# Patient Record
Sex: MALE | Race: AMERICAN INDIAN OR ALASKA NATIVE
[De-identification: names, ages, dates, MRNs, and addresses within clinical notes are randomized per-mention and may not be internally consistent; named-entity substitution may affect disease eponyms.]

---

## 2018-05-27 ENCOUNTER — HOSPITAL ENCOUNTER (EMERGENCY)
Dept: HOSPITAL 5 - ED | Age: 48
Discharge: HOME | End: 2018-05-27
Payer: SELF-PAY

## 2018-05-27 VITALS — DIASTOLIC BLOOD PRESSURE: 88 MMHG | SYSTOLIC BLOOD PRESSURE: 143 MMHG

## 2018-05-27 DIAGNOSIS — F12.10: ICD-10-CM

## 2018-05-27 DIAGNOSIS — I10: ICD-10-CM

## 2018-05-27 DIAGNOSIS — F14.10: ICD-10-CM

## 2018-05-27 DIAGNOSIS — R56.9: Primary | ICD-10-CM

## 2018-05-27 DIAGNOSIS — Z87.891: ICD-10-CM

## 2018-05-27 LAB
BUN SERPL-MCNC: 19 MG/DL (ref 9–20)
BUN/CREAT SERPL: 19 %
CALCIUM SERPL-MCNC: 8.9 MG/DL (ref 8.4–10.2)
HCT VFR BLD CALC: 39.6 % (ref 35.5–45.6)
HEMOLYSIS INDEX: 47
HGB BLD-MCNC: 13.5 GM/DL (ref 11.8–15.2)
MCH RBC QN AUTO: 30 PG (ref 28–32)
MCHC RBC AUTO-ENTMCNC: 34 % (ref 32–34)
MCV RBC AUTO: 87 FL (ref 84–94)
PLATELET # BLD: 133 K/MM3 (ref 140–440)
RBC # BLD AUTO: 4.57 M/MM3 (ref 3.65–5.03)

## 2018-05-27 PROCEDURE — 36415 COLL VENOUS BLD VENIPUNCTURE: CPT

## 2018-05-27 PROCEDURE — 83735 ASSAY OF MAGNESIUM: CPT

## 2018-05-27 PROCEDURE — 99284 EMERGENCY DEPT VISIT MOD MDM: CPT

## 2018-05-27 PROCEDURE — 80048 BASIC METABOLIC PNL TOTAL CA: CPT

## 2018-05-27 PROCEDURE — 96374 THER/PROPH/DIAG INJ IV PUSH: CPT

## 2018-05-27 PROCEDURE — 85027 COMPLETE CBC AUTOMATED: CPT

## 2018-05-27 NOTE — EMERGENCY DEPARTMENT REPORT
HPI





- General


Chief Complaint: Seizure


Time Seen by Provider: 05/27/18 13:25





- HPI


HPI: 





Room 5





The patient is a 49-year-old male presenting with a chief complaint seizures.  

The patient has a history of seizure disorder from traumatic brain injury.  The 

patient's spouse states at approximately 10:40 the patient had a generalized 

tonic-clonic seizure lasting approximately 3-4 minutes.  EMS was called and the 

patient's transport to the ED.  The patient states he's been compliant with his 

antiepileptic medication.  Patient currently denies complaints stating he feels 

normal.  The patient does admit to cocaine use and states he last used 

approximately 3 days ago





Location: Central nervous system


Duration: 3-4 minutes


Quality: Generalized tonic-clonic


Severity: Moderate


Modifying factors: [see above]


Context: [see above]


Mode of transportation: [not driving]





ED Past Medical Hx





- Past Medical History


Previous Medical History?: Yes


Hx Hypertension: Yes


Hx Seizures: Yes


Hx Psychiatric Treatment: Yes (PTSD)


Additional medical history: PTSD, Traumatic brain injury





- Surgical History


Past Surgical History?: Yes


Additional Surgical History: Right shoulder surgery





- Family History


Family history: no significant





- Social History


Smoking Status: Former Smoker (none 1 year)


Substance Use Type: Alcohol (occasional), Cocaine (last used 3 days ago), 

Marijuana





ED Review of Systems


ROS: 


Stated complaint: SEIZURE


Other details as noted in HPI





Constitutional: no symptoms reported


Eyes: denies: eye pain


ENT: denies: throat pain


Cardiovascular: denies: chest pain


Gastrointestinal: denies: abdominal pain


Genitourinary: denies: dysuria


Musculoskeletal: denies: back pain


Neurological: denies: headache





Physical Exam





- Physical Exam


Vital Signs: 


 Vital Signs











  05/27/18 05/27/18 05/27/18





  12:00 12:15 12:30


 


Pulse Rate  81 76


 


Respiratory  30 H 18





Rate   


 


Blood Pressure  136/96 143/99


 


O2 Sat by Pulse 95 97 98





Oximetry   














  05/27/18 05/27/18





  12:45 13:00


 


Pulse Rate 62 65


 


Respiratory 19 20





Rate  


 


Blood Pressure 139/86 143/88


 


O2 Sat by Pulse 91 95





Oximetry  











Physical Exam: 





GENERAL: The patient is well-developed well-nourished male lying on stretcher 

not appear to be in acute distress. []


HEENT: Normocephalic.  Atraumatic.  Extraocular motions are intact.  Patient 

has moist mucous membranes.


NECK: Supple.  Trachea midline


CHEST/LUNGS: Clear to auscultation.  There is no respiratory distress noted.


HEART/CARDIOVASCULAR: Regular.  There is no tachycardia.  There is no gallop 

rub or murmur.


ABDOMEN: Abdomen is soft, nontender.  Patient has normal bowel sounds.  There 

is no abdominal distention.


SKIN: There is no rash.  There is no edema.  There is no diaphoresis.


NEURO: The patient is awake, alert, and oriented.  The patient is cooperative.  

The patient has no focal neurologic deficits.  The patient has normal speech.  

Cranial nerves II through XII grossly intact, no drift


MUSCULOSKELETAL:  There is no evidence of acute injury.





ED Course


 Vital Signs











  05/27/18 05/27/18 05/27/18





  12:00 12:15 12:30


 


Pulse Rate  81 76


 


Respiratory  30 H 18





Rate   


 


Blood Pressure  136/96 143/99


 


O2 Sat by Pulse 95 97 98





Oximetry   














  05/27/18 05/27/18





  12:45 13:00


 


Pulse Rate 62 65


 


Respiratory 19 20





Rate  


 


Blood Pressure 139/86 143/88


 


O2 Sat by Pulse 91 95





Oximetry  














ED Medical Decision Making





- Lab Data


Result diagrams: 


 05/27/18 12:29





 05/27/18 12:29





 Laboratory Tests











  05/27/18 05/27/18 05/27/18





  12:29 12:29 13:52


 


WBC  4.2 L  


 


RBC  4.57  


 


Hgb  13.5  


 


Hct  39.6  


 


MCV  87  


 


MCH  30  


 


MCHC  34  


 


RDW  13.9  


 


Plt Count  133 L  


 


Sodium   138 


 


Potassium   4.2 


 


Chloride   102.4 


 


Carbon Dioxide   24 


 


Anion Gap   16 


 


BUN   19 


 


Creatinine   1.0 


 


Estimated GFR   > 60 


 


BUN/Creatinine Ratio   19 


 


Glucose   101 H 


 


Calcium   8.9 


 


Magnesium    2.20














- Differential Diagnosis


seizure, cocaine abuse


Critical care attestation.: 


If time is entered above; I have spent that time in minutes in the direct care 

of this critically ill patient, excluding procedure time.








ED Disposition


Clinical Impression: 


 Seizure, Substance abuse





Disposition: DC-01 TO HOME OR SELFCARE


Is pt being admited?: No


Does the pt Need Aspirin: No


Condition: Stable


Instructions:  Cocaine Abuse (ED), Recurrent Seizures Adult (ED)


Additional Instructions: 


Return to the emergency department immediately should you develop worsening 

symptoms, fever, inability to tolerate food or liquid or any other concerns.


Referrals: 


PRIMARY CARE,MD [Primary Care Provider] - 3-5 Days


TJ LOREDO MD [Staff Physician] - 3-5 Days (Dr. Loredo is a neurologist.  

Please follow up with him or your own neurologist for further evaluation)


Time of Disposition: 14:26

## 2018-07-09 ENCOUNTER — HOSPITAL ENCOUNTER (OUTPATIENT)
Dept: HOSPITAL 5 - ED | Age: 48
Setting detail: OBSERVATION
LOS: 2 days | Discharge: HOME | End: 2018-07-11
Attending: INTERNAL MEDICINE | Admitting: INTERNAL MEDICINE
Payer: COMMERCIAL

## 2018-07-09 DIAGNOSIS — R79.89: ICD-10-CM

## 2018-07-09 DIAGNOSIS — Z87.820: ICD-10-CM

## 2018-07-09 DIAGNOSIS — F14.10: ICD-10-CM

## 2018-07-09 DIAGNOSIS — R56.9: Primary | ICD-10-CM

## 2018-07-09 DIAGNOSIS — Z91.19: ICD-10-CM

## 2018-07-09 DIAGNOSIS — F41.9: ICD-10-CM

## 2018-07-09 DIAGNOSIS — I10: ICD-10-CM

## 2018-07-09 LAB
ALBUMIN SERPL-MCNC: 4.6 G/DL (ref 3.9–5)
ALT SERPL-CCNC: 69 UNITS/L (ref 7–56)
BENZODIAZEPINES SCREEN,URINE: (no result)
BILIRUB UR QL STRIP: (no result)
BLOOD UR QL VISUAL: (no result)
BUN SERPL-MCNC: 12 MG/DL (ref 9–20)
BUN SERPL-MCNC: 14 MG/DL (ref 9–20)
BUN/CREAT SERPL: 11 %
BUN/CREAT SERPL: 9 %
CALCIUM SERPL-MCNC: 9.1 MG/DL (ref 8.4–10.2)
CALCIUM SERPL-MCNC: 9.4 MG/DL (ref 8.4–10.2)
HCT VFR BLD CALC: 48.3 % (ref 35.5–45.6)
HEMOLYSIS INDEX: 7
HEMOLYSIS INDEX: 9
HGB BLD-MCNC: 15.4 GM/DL (ref 11.8–15.2)
MCH RBC QN AUTO: 30 PG (ref 28–32)
MCHC RBC AUTO-ENTMCNC: 32 % (ref 32–34)
MCV RBC AUTO: 92 FL (ref 84–94)
METHADONE SCREEN,URINE: (no result)
MUCOUS THREADS #/AREA URNS HPF: (no result) /HPF
OPIATE SCREEN,URINE: (no result)
PH UR STRIP: 5 [PH] (ref 5–7)
PLATELET # BLD: 193 K/MM3 (ref 140–440)
RBC # BLD AUTO: 5.24 M/MM3 (ref 3.65–5.03)
RBC #/AREA URNS HPF: 3 /HPF (ref 0–6)
UROBILINOGEN UR-MCNC: < 2 MG/DL (ref ?–2)
WBC #/AREA URNS HPF: 1 /HPF (ref 0–6)

## 2018-07-09 PROCEDURE — 93005 ELECTROCARDIOGRAM TRACING: CPT

## 2018-07-09 PROCEDURE — 82962 GLUCOSE BLOOD TEST: CPT

## 2018-07-09 PROCEDURE — 70450 CT HEAD/BRAIN W/O DYE: CPT

## 2018-07-09 PROCEDURE — 96376 TX/PRO/DX INJ SAME DRUG ADON: CPT

## 2018-07-09 PROCEDURE — 96375 TX/PRO/DX INJ NEW DRUG ADDON: CPT

## 2018-07-09 PROCEDURE — 72125 CT NECK SPINE W/O DYE: CPT

## 2018-07-09 PROCEDURE — 94760 N-INVAS EAR/PLS OXIMETRY 1: CPT

## 2018-07-09 PROCEDURE — 80307 DRUG TEST PRSMV CHEM ANLYZR: CPT

## 2018-07-09 PROCEDURE — 80048 BASIC METABOLIC PNL TOTAL CA: CPT

## 2018-07-09 PROCEDURE — 96365 THER/PROPH/DIAG IV INF INIT: CPT

## 2018-07-09 PROCEDURE — 82140 ASSAY OF AMMONIA: CPT

## 2018-07-09 PROCEDURE — 96372 THER/PROPH/DIAG INJ SC/IM: CPT

## 2018-07-09 PROCEDURE — 81001 URINALYSIS AUTO W/SCOPE: CPT

## 2018-07-09 PROCEDURE — 36415 COLL VENOUS BLD VENIPUNCTURE: CPT

## 2018-07-09 PROCEDURE — 99285 EMERGENCY DEPT VISIT HI MDM: CPT

## 2018-07-09 PROCEDURE — 80053 COMPREHEN METABOLIC PANEL: CPT

## 2018-07-09 PROCEDURE — 85025 COMPLETE CBC W/AUTO DIFF WBC: CPT

## 2018-07-09 PROCEDURE — 93010 ELECTROCARDIOGRAM REPORT: CPT

## 2018-07-09 PROCEDURE — 96361 HYDRATE IV INFUSION ADD-ON: CPT

## 2018-07-09 PROCEDURE — G0378 HOSPITAL OBSERVATION PER HR: HCPCS

## 2018-07-09 PROCEDURE — 87040 BLOOD CULTURE FOR BACTERIA: CPT

## 2018-07-09 PROCEDURE — 80175 DRUG SCREEN QUAN LAMOTRIGINE: CPT

## 2018-07-09 PROCEDURE — 80177 DRUG SCRN QUAN LEVETIRACETAM: CPT

## 2018-07-09 PROCEDURE — 85027 COMPLETE CBC AUTOMATED: CPT

## 2018-07-09 NOTE — CAT SCAN REPORT
FINAL REPORT



EXAM:  CT HEAD/BRAIN WO CON



HISTORY:  seizure, depressed LOC, unc. if trauma;  + prior hx 



TECHNIQUE:  CT head without contrast 



PRIORS:  None.



FINDINGS:  

No acute intra-axial or extra-axial hemorrhage is identified. 

There is no evidence of midline shift or mass effect.  The

ventricles and sulci are within normal limits. There is

hypodensity within the inferior left frontal lobe gray-white

matter differentiation appears intact. No additional focal

parenchymal abnormalities are identified 



Bony calvarium is grossly intact.  Visualized portions of the

mastoids and paranasal sinuses are unremarkable. 



IMPRESSION:  

White matter hypodensity left frontal lobe. This appears likely

chronic post ischemic or chronic posttraumatic in nature. Please

correlate with clinical history 



Otherwise negative study

## 2018-07-09 NOTE — CAT SCAN REPORT
FINAL REPORT



EXAM:  CT CERVICAL SPINE WO CON



HISTORY:  seizure, depressed LOC, unc. if trauma;  + prior hx 



TECHNIQUE:  CT cervical spine with reconstructions 



PRIORS:  None.



FINDINGS:  

Vertebral bodies demonstrate normal height and alignment. There

is some degenerative disc change with disc space narrowing and

anterior osteophyte C4-C5-C5-C6 and C6-C7. The facet joints

demonstrate normal alignment. The spinous processes are intact. 

Craniocervical junction is unremarkable.  C1 and C2 are intact. 



IMPRESSION:  

Degenerative disc changes lower lumbar spine



No acute abnormality seen.

## 2018-07-09 NOTE — HISTORY AND PHYSICAL REPORT
History of Present Illness


Date of examination: 07/16/18


Date of admission: 


07/09/18 19:39





History of present illness: 


History per girlfriend at bedside.  For the 7-year-old man with a history of 

seizure, TBI, anxiety, hypertension was brought to the emergency room because 

he had a seizure at home.  The girlfriend stated that he missed a dose of his 

antiepileptic last night, she is not sure if he took his medications today.  

The patient is sedated, review of systems unobtainable





PAST MEDICAL HISTORY: Seizure, TBI, anxiety, hypertension





PAST SURGICAL HISTORY: Shoulder, eye surgery





SOCIAL HISTORY: No alcohol, urine positive for cocaine, marijuana, positive 

tobacco use





FAMILY HISTORY: Hypertension








Medications and Allergies


 Allergies











Allergy/AdvReac Type Severity Reaction Status Date / Time


 


No Known Allergies Allergy   Unverified 05/27/18 12:15











 Home Medications











 Medication  Instructions  Recorded  Confirmed  Last Taken  Type


 


Ibuprofen [Motrin 800 MG tab] 800 mg PO Q8HR PRN 07/09/18 07/09/18 Unknown 

History


 


Hydroxyzine HCl 10 mg PO TID PRN #90 tablet 07/11/18  Unknown Rx


 


Labetalol [Normodyne TAB] 200 mg PO BID #60 tablet 07/11/18  Unknown Rx


 


Magnesium Oxide 420 mg PO BID #60 tablet 07/11/18  Unknown Rx


 


Ziprasidone [Geodon] 40 mg PO BID #60 capsule 07/11/18  Unknown Rx


 


lamoTRIgine [LaMICtal] 200 mg PO BID #60 tablet 07/11/18  Unknown Rx


 


levETIRAcetam [Keppra TAB] 1,500 mg PO BID #60 tablet 07/11/18  Unknown Rx


 


levETIRAcetam [Keppra TAB] 2,000 mg PO BID #60 tablet 07/11/18  Unknown Rx


 


traZODone [Desyrel] 100 mg PO QHS #30 tablet 07/11/18  Unknown Rx











Active Meds: 


Active Medications





Enoxaparin Sodium (Lovenox)  30 mg SUB-Q QDAY JACKIE


Lorazepam (Ativan)  1 mg IV Q4H PRN


   PRN Reason: Seizures











Exam





- Physical Exam


Narrative exam: 


Gen. appearance: Patient lying in bed, no apparent distress


HEENT: Normocephalic, atraumatic, pupils equally round and reactive to light,  

extraocular movement intact, and no sclericterus,. No JVD or thyromegaly or 

nodule,neck supple, no carotid bruit ,mucous membranes moist, no exudate or 

erythema


Heart: S1, S2, regular rate and rhythm


Lungs: Clear bilaterally, breathing comfortable


Abdomen: Positive bowel sounds, non-tender, nondistended, no organomegaly


Extremity:no edema cyanosis, clubbing


Skin:  no rash, dry, warm


Neuro: post-ictal





- Constitutional


Vitals: 


 











Temp Pulse Resp BP Pulse Ox


 


 98.7 F   100 H  16   138/97   96 


 


 07/09/18 18:52  07/09/18 21:41  07/09/18 21:41  07/09/18 21:41  07/09/18 21:41














Results





- Labs


CBC & Chem 7: 


 07/11/18 04:38





 07/11/18 04:38


Labs: 


 Abnormal lab results











  07/09/18 07/09/18 07/09/18 Range/Units





  14:28 14:28 17:09 


 


RBC  5.24 H    (3.65-5.03)  M/mm3


 


Hgb  15.4 H    (11.8-15.2)  gm/dl


 


Hct  48.3 H    (35.5-45.6)  %


 


Carbon Dioxide   9 L*   (22-30)  mmol/L


 


Glucose   110 H   ()  mg/dL


 


Lactic Acid    7.00 H*  (0.7-2.0)  mmol/L


 


AST     (5-40)  units/L


 


ALT     (7-56)  units/L














  07/09/18 07/09/18 07/09/18 Range/Units





  18:30 18:30 21:04 


 


RBC     (3.65-5.03)  M/mm3


 


Hgb     (11.8-15.2)  gm/dl


 


Hct     (35.5-45.6)  %


 


Carbon Dioxide   18 L D   (22-30)  mmol/L


 


Glucose   108 H   ()  mg/dL


 


Lactic Acid  5.40 H*   3.70 H*  (0.7-2.0)  mmol/L


 


AST   83 H   (5-40)  units/L


 


ALT   69 H   (7-56)  units/L














- Imaging and Cardiology


CT Scan - head: report reviewed





Assessment and Plan


.  CT C-spine revealed


Assessment


Acute on chronic seizure


Hypertension


Elevated lactate level  secondary to seizure


Anxiety


TBI


Non-Compliance


Substance abuse





Plan


Admit to medicine


Status post Jesus dose of IV Keppra


Continue outpatient medication, IV ativan as needed for breakthrough seizure.


Start emperic Rocephin, follow cultures


DVT prophalaxis

## 2018-07-09 NOTE — EMERGENCY DEPARTMENT REPORT
ED Seizure HPI





- General


Chief Complaint: Seizure


Stated Complaint: POSS SEIZURE ACTIVITY


Time Seen by Provider: 07/09/18 15:19


Source: EMS


Mode of arrival: Stretcher


Limitations: Altered Mental Status





- History of Present Illness


Initial Comments: 





Patient with past history seizures, had new seizure episode today, with last 

prior episode occurring 6 weeks ago, with patient being evaluated here on May 27

, and which was felt to be associated with cocaine use.  It's unknown at time 

of intake where the patient had been using cocaine, due to his persistent 

postictal status.  He was transported by EMS, had isolated seizure in emergency 

department was also given intravenous lorazepam after arrival which controlled 

seizure, and after which patient was given IV infusion of 1 g levetiracetam.  





Wife is at bedside at time of my examination, reports that patient typically 

has seizures of approximately 3 minutes, with uncomplicated recovery, and that 

patient was found by their son, reporting that he was poorly responsive.  EMS 

was called, patient was confused, probably postictal and has not woken up in 

the ED, although briefly rousable; he appears to be moderately agitated and 

diaphoretic.  





Wife gives no history of recent systemic symptoms, no fever chills or 

diaphoresis, no previous complaints of illness, and is not aware of patient 

having used any recreational drugs, per reports that his prior drug use was 

from another friend who generally was able to entice patient into using cocaine

, but that he had been abstinent for the past month, but also, significantly, 

that patient left the home last evening for couple hours, and she was not able 

to account for his whereabouts.  She is unsure of whether he had been using any 

recent recreational drugs but finds it very likely that he was visted by a 

friend who uses cocaine and may have participated.  Patient also has a past 

history of tobacco abuse, elevated blood pressure and PTSD but otherwise in 

good health.





- Related Data


 Home Medications











 Medication  Instructions  Recorded  Confirmed  Last Taken


 


Hydroxyzine HCl 10 mg PO TID PRN 07/09/18 07/09/18 Unknown


 


Ibuprofen [Motrin] 800 mg PO Q8HR PRN 07/09/18 07/09/18 Unknown


 


Magnesium Oxide 420 mg PO BID 07/09/18 07/09/18 Unknown


 


Ziprasidone [Geodon] 40 mg PO BID 07/09/18 07/09/18 Unknown


 


lamoTRIgine [LaMICtal] 200 mg PO BID 07/09/18 07/09/18 Unknown


 


levETIRAcetam [Keppra TAB] 1,500 mg PO BID 07/09/18 07/09/18 Unknown


 


traZODone [Desyrel] 100 mg PO QHS 07/09/18 07/09/18 Unknown











 Allergies











Allergy/AdvReac Type Severity Reaction Status Date / Time


 


No Known Allergies Allergy   Unverified 05/27/18 12:15














ED Review of Systems


ROS: 


Stated complaint: POSS SEIZURE ACTIVITY


Other details as noted in HPI





Comment: Unobtainable due to pts medical conditions (most pertinent history 

obtained from wife who is at bedside during)


Constitutional: denies: chills, diaphoresis, fever


ENT: denies: throat pain


Respiratory: denies: cough, shortness of breath


Cardiovascular: denies: chest pain


Endocrine: no symptoms reported


Gastrointestinal: denies: abdominal pain, nausea, vomiting, diarrhea


Genitourinary: denies: urgency, dysuria


Musculoskeletal: denies: back pain, joint swelling


Skin: denies: rash


Neurological: denies: headache, weakness, numbness, paresthesias


Psychiatric: denies: anxiety


Hematological/Lymphatic: denies: easy bleeding, easy bruising





ED Past Medical Hx





- Past Medical History


Previous Medical History?: Yes


Hx Hypertension: Yes


Hx Seizures: Yes


Hx Psychiatric Treatment: Yes (PTSD)


Additional medical history: PTSD, Traumatic brain injury





- Surgical History


Past Surgical History?: Yes


Additional Surgical History: Right shoulder surgery





- Social History


Smoking Status: Never Smoker


Substance Use Type: None





- Medications


Home Medications: 


 Home Medications











 Medication  Instructions  Recorded  Confirmed  Last Taken  Type


 


Hydroxyzine HCl 10 mg PO TID PRN 07/09/18 07/09/18 Unknown History


 


Ibuprofen [Motrin] 800 mg PO Q8HR PRN 07/09/18 07/09/18 Unknown History


 


Magnesium Oxide 420 mg PO BID 07/09/18 07/09/18 Unknown History


 


Ziprasidone [Geodon] 40 mg PO BID 07/09/18 07/09/18 Unknown History


 


lamoTRIgine [LaMICtal] 200 mg PO BID 07/09/18 07/09/18 Unknown History


 


levETIRAcetam [Keppra TAB] 1,500 mg PO BID 07/09/18 07/09/18 Unknown History


 


traZODone [Desyrel] 100 mg PO QHS 07/09/18 07/09/18 Unknown History














ED Physical Exam





- General


Limitations: Altered Mental Status


General appearance: obtunded (patient so lethargic, but exam performed within 

recent time period of intravenous lorazepam)





- Head


Head exam: Present: atraumatic, normocephalic, normal inspection





- Eye


Eye exam: Present: PERRL





- ENT


ENT exam: Present: normal exam





- Neck


Neck exam: Present: normal inspection, full ROM





- Respiratory


Respiratory exam: Present: normal lung sounds bilaterally.  Absent: wheezes, 

rales, rhonchi





- Cardiovascular


Cardiovascular Exam: Present: regular rate, normal heart sounds





- GI/Abdominal


GI/Abdominal exam: Present: soft, tenderness (the palpation does not produce 

discomfort, but also does not arouse patient)





- Rectal


Rectal exam: Present: deferred





- Extremities Exam


Extremities exam: Present: normal inspection, normal capillary refill.  Absent: 

pedal edema





- Back Exam


Back exam: Present: normal inspection





- Neurological Exam


Neurological exam: Present: other (patient is obtunded, unable to perform 

adequate neurologic exam, but patient does move all extremities spontaneously 

during initial)





- Psychiatric


Psychiatric exam: Present: other (unable to assess due to patient being obtunded

, postictal)





- Skin


Skin exam: Present: warm, diaphoretic





ED Course


 Vital Signs











  07/09/18 07/09/18 07/09/18





  14:16 14:22 14:30


 


Temperature   


 


Pulse Rate  117 H 


 


Respiratory  26 H 





Rate   


 


Blood Pressure  149/98 149/98


 


O2 Sat by Pulse 96 99 100





Oximetry   














  07/09/18 07/09/18 07/09/18





  15:00 15:30 16:00


 


Temperature   


 


Pulse Rate 113 H 75 91 H


 


Respiratory 33 H 17 28 H





Rate   


 


Blood Pressure 160/88 179/85 


 


O2 Sat by Pulse  97 96





Oximetry   














  07/09/18 07/09/18 07/09/18





  16:30 17:03 17:30


 


Temperature   


 


Pulse Rate 99 H 103 H 140 H


 


Respiratory 20 22 29 H





Rate   


 


Blood Pressure 152/95  158/92


 


O2 Sat by Pulse 98 98 99





Oximetry   














  07/09/18





  18:52


 


Temperature 37.1 C


 


Pulse Rate 


 


Respiratory 





Rate 


 


Blood Pressure 


 


O2 Sat by Pulse 





Oximetry 














ED Medical Decision Making





- Lab Data


Result diagrams: 


 07/09/18 14:28





 07/09/18 18:30





- EKG Data


-: EKG Interpreted by Me


EKG shows normal: sinus rhythm, axis, intervals (normal intervals), QRS 

complexes (LVH noted by voltage criteria and anterior limb lead 1), ST-T waves (

normal ST-T wave segment)





- Medical Decision Making





Patient has had an acute seizure with known cocaine use the previous evening; 

he has a known history of seizures, with last seizure here in ED associated 

with cocaine use as well.  This episode appears significantly different from 

prior episodes, with patient having significant unobserved.,  Possible extended 

seizure activity, definite cocaine use, prolonged postictal period, and has 

significant laboratory abnormalities, with a markedly elevated lactic acid of 9

, as well as a markedly depressed bicarbonate level of 9, indicating dehydration

, possible acidosis, but which could be accommodated by significant seizure 

activity, which patient had, and which was unwitnessed for a significant period 

of time, and could've been compounded by a relative state of starvation.  

Although patient is stable, appears to be comfortable, arouses easily, he would 

benefit from IV rehydration, continuation of seizure precautions, and his 

antiepileptic medication, and observation overnight, until his lactic acid has 

stabilized, and there are no additional signs of other underlying organic 

illness.  Hospitalist was contacted, except patient for admission, and patient 

will be placed under observation.


Critical care attestation.: 


If time is entered above; I have spent that time in minutes in the direct care 

of this critically ill patient, excluding procedure time.








ED Disposition


Clinical Impression: 


 Seizure, Cocaine abuse, PTSD (post-traumatic stress disorder)





Hypertension


Qualifiers:


 Hypertension type: essential hypertension Qualified Code(s): I10 - Essential (

primary) hypertension





Disposition: DC-09 OP ADMIT IP TO THIS HOSP


Is pt being admited?: Yes


Does the pt Need Aspirin: No


Condition: Stable


Instructions:  Hypertension (ED)


Referrals: 


PRIMARY CARE,MD [Primary Care Provider] - 3-5 Days


Time of Disposition: 19:37

## 2018-07-10 LAB
BASOPHILS # (AUTO): 0 K/MM3 (ref 0–0.1)
BASOPHILS NFR BLD AUTO: 0.1 % (ref 0–1.8)
BUN SERPL-MCNC: 13 MG/DL (ref 9–20)
BUN/CREAT SERPL: 13 %
CALCIUM SERPL-MCNC: 9.4 MG/DL (ref 8.4–10.2)
EOSINOPHIL # BLD AUTO: 0 K/MM3 (ref 0–0.4)
EOSINOPHIL NFR BLD AUTO: 0 % (ref 0–4.3)
HCT VFR BLD CALC: 42.7 % (ref 35.5–45.6)
HEMOLYSIS INDEX: 61
HGB BLD-MCNC: 14.3 GM/DL (ref 11.8–15.2)
LYMPHOCYTES # BLD AUTO: 1.3 K/MM3 (ref 1.2–5.4)
LYMPHOCYTES NFR BLD AUTO: 9.1 % (ref 13.4–35)
MCH RBC QN AUTO: 30 PG (ref 28–32)
MCHC RBC AUTO-ENTMCNC: 34 % (ref 32–34)
MCV RBC AUTO: 88 FL (ref 84–94)
MONOCYTES # (AUTO): 1 K/MM3 (ref 0–0.8)
MONOCYTES % (AUTO): 7.1 % (ref 0–7.3)
PLATELET # BLD: 151 K/MM3 (ref 140–440)
RBC # BLD AUTO: 4.85 M/MM3 (ref 3.65–5.03)

## 2018-07-10 RX ADMIN — ACETAMINOPHEN PRN MG: 325 TABLET ORAL at 19:49

## 2018-07-10 RX ADMIN — Medication SCH ML: at 22:12

## 2018-07-10 RX ADMIN — ZIPRASIDONE HYDROCHLORIDE SCH MG: 20 CAPSULE ORAL at 22:07

## 2018-07-10 RX ADMIN — SODIUM CHLORIDE SCH MLS/HR: 0.45 INJECTION, SOLUTION INTRAVENOUS at 19:49

## 2018-07-10 RX ADMIN — LEVETIRACETAM SCH MG: 500 TABLET, FILM COATED ORAL at 22:06

## 2018-07-10 RX ADMIN — CEFTRIAXONE SODIUM SCH MLS/HR: 1 INJECTION, POWDER, FOR SOLUTION INTRAMUSCULAR; INTRAVENOUS at 09:21

## 2018-07-10 RX ADMIN — ENOXAPARIN SODIUM SCH MG: 100 INJECTION SUBCUTANEOUS at 09:22

## 2018-07-10 RX ADMIN — Medication SCH ML: at 10:24

## 2018-07-10 RX ADMIN — LEVETIRACETAM SCH: 500 TABLET, FILM COATED ORAL at 22:11

## 2018-07-10 RX ADMIN — Medication SCH: at 22:11

## 2018-07-10 RX ADMIN — ACETAMINOPHEN PRN MG: 325 TABLET ORAL at 01:42

## 2018-07-10 NOTE — CONSULTATION
History of Present Illness


Consult date: 07/10/18


Requesting physician: MARTÍN COBIAN


Reason for Consult: seizures


Chief complaint: 


seizures








History of present illness: 


This 47-year-old right-handed -American male has had seizures since a 

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  Another head injury caused seizures to 

recur in 2005 when he was no longer on medication.  According to his fiance, 

he has 1 or 2 seizures per month only some of which are known to be related to 

use of cocaine which he says he uses rarely though he had one associated with 

cocaine both in May and at the time of this admission.  He does however miss 

medications at times and does not use a Mediset.  He used to get a warning of a 

smell like gun powder prior to the episodes but no longer has a warning.  His 

fiance shows me videos of several seizures in which he stares, looks side to 

side, may have some tongue thrusting, has shaking on the left and stiffening on 

the right with his fist balled up on the right.  These look authentic to me.  

She says a seizure lasts 3 minutes and it takes him another minute or 2 to be 

responsive and sometimes he is sleepy afterwards.  He does not have tongue 

biting or incontinence however.  He takes levetiracetam 750 mg size, 2 twice a 

day which was given to him instead of Dilantin that had not worked well.  

Lamotrigine was later added about 2 years ago of which he takes now 200 mg 

twice a day.  He [facility] [place] 

but he says his primary care provider [facility]rasher [facility] has checked his 

blood levels and that he is had EE[facility] and [facility] and was never told 

there was any abnormality.  He is about to be getting [facility].  

He has had no recent changes in doses of his seizure medications.  CT scan here 

shows moderate cerebellar and moderate to severe cerebral atrophy.








Past History


Past Medical History: seizures, other (PTSD, childhood asthma)


Past Surgical History: Other (eye surgery for the 1988 injury on the left, 

traumatic injury to the right ear which was bitten off and had to be repaired)


Social history: single, other (high school graduate, was in the [employer] 

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  denies: 

smoking, alcohol abuse (drinks a sixpack every other day but is planning to 

quit drinking), prescription drug abuse, IV drug use (cocaine rarely that not 

intravenous marijuana every 2-3 days)


Family history: diabetes (mother), hypertension, other.  denies: stroke





Medications and Allergies


 Allergies











Allergy/AdvReac Type Severity Reaction Status Date / Time


 


No Known Allergies Allergy   Unverified 05/27/18 12:15











 Home Medications











 Medication  Instructions  Recorded  Confirmed  Last Taken  Type


 


Hydroxyzine HCl 10 mg PO TID PRN 07/09/18 07/09/18 Unknown History


 


Ibuprofen [Motrin] 800 mg PO Q8HR PRN 07/09/18 07/09/18 Unknown History


 


Magnesium Oxide 420 mg PO BID 07/09/18 07/09/18 Unknown History


 


Ziprasidone [Geodon] 40 mg PO BID 07/09/18 07/09/18 Unknown History


 


lamoTRIgine [LaMICtal] 200 mg PO BID 07/09/18 07/09/18 Unknown History


 


levETIRAcetam [Keppra TAB] 1,500 mg PO BID 07/09/18 07/09/18 Unknown History


 


traZODone [Desyrel] 100 mg PO QHS 07/09/18 07/09/18 Unknown History











Active Meds: 


Active Medications





Acetaminophen (Tylenol)  650 mg PO Q4H PRN


   PRN Reason: Pain MILD(1-3)/Fever >100.5/HA


   Last Admin: 07/10/18 19:49 Dose:  650 mg


Enoxaparin Sodium (Lovenox)  40 mg SUB-Q QDAY@1000 JACKIE


   Last Admin: 07/10/18 09:22 Dose:  40 mg


Hydralazine HCl (Apresoline)  5 mg IV Q6HR PRN


   PRN Reason: Hypertension


Sodium Chloride (Nacl 0.45% 1000 Ml)  1,000 mls @ 125 mls/hr IV AS DIRECT JACKIE


   Last Admin: 07/10/18 19:49 Dose:  125 mls/hr


Ceftriaxone Sodium (Rocephin/Ns 1 Gm/50 Ml)  1 gm in 50 mls @ 100 mls/hr IV 

Q24HR JACKIE; Protocol


   Last Admin: 07/10/18 09:21 Dose:  100 mls/hr


Lamotrigine (Lamictal)  200 mg PO BID [facility]


   Last Admin: 07/10/18 09:21 Dose:  200 mg


Levetiracetam (Keppra)  2,000 mg PO BID [facility]


Lorazepam (Ativan)  1 mg IV Q4H PRN


   PRN Reason: Seizures


Ondansetron HCl (Zofran)  4 mg IV Q8H PRN


   PRN Reason: Nausea And Vomiting


Sodium Chloride (Sodium Chloride Flush Syringe 10 Ml)  10 ml IV BID [facility]


   Last Admin: 07/10/18 10:24 Dose:  10 ml


Sodium Chloride (Sodium Chloride Flush Syringe 10 Ml)  10 ml IV PRN PRN


   PRN Reason: LINE FLUSH


Ziprasidone (Geodon)  40 mg PO BID [facility]


   Last Admin: 07/10/18 09:21 Dose:  40 mg











Review of Systems


All systems: negative (occasional headaches, no dizziness, snoring can be 

allowed with some positive noted but never timed, sleeps 8 hours and then feels 

rested, not dozing off and not napping during the day, not driving.)





Physical Examination





- Vital Signs


Vital Signs: 


 Vital Signs











Pulse Ox


 


 96 


 


 07/09/18 14:16














- Physical Exam


Narrative exam: 


General Appearance: well developed but overweight (per BMI) late 40s -

American male in NAD was seen with his fiance.





HEENT: atraumatic, normocephalic; no bruits, 2+ Tory without soreness or 

induration or enlargement, sclerae nonicteric.  Oropharynx pink and moist. 


Neck: supple, no bruits.  


Heart: no murmur or extra sounds.


Extremities: no clubbing, cyanosis or edema.  2+ dorsalis pedis pulses 

bilaterally.





Neurologic Exam:


Mental Status: Awake, alert, oriented X 3, speech is clear, names pen and ball 

not ballpoint of pen though he gets comb and its teeth, and abstracts well.  

Names President but not , serial 7's intact, has some right-left 

confusion and perseverates with the task, gets 2 of 3 objects at 3 minutes, 

spells WORLD backwards DLORW.  


Cranial Nerves: fields full, no papilledema, SVPs present, PERRLA, EOMs full 

without nystagmus or diplopia, facial sensation intact to pinprick and light 

touch, no facial weakness, Gaytan is midline, palate rises symmetrically to 

phonation, shoulder shrug is 5 X 2, tongue protrudes slightly to the right.


Cerebellar: finger to nose and heel to shin are normal.  


Sensory: intact to light touch, pinprick, and vibrations.  Double simultaneous 

stimulation is intact.  


Motor Exam Upper Extremities: no drift or pronation, Edwina intact.  are 5 X 

2, tone is normal.  No atrophy or fasciculations are noted visually. 


Motor Exam Lower Extremities: no leg lag, quadriceps and anterior tibials and 

gastrocnemius are 5 X 2.  Edwina intact.  Tone is normal.  No atrophy or 

fasciculations are noted visually. 


Reflexes: Palmomental, snout and jaw jerk are negative.  Triceps, biceps and 

brachioradialis are 1 bilaterally.  Christopher's is negative on the right is 

slightly positive on the left.  Knee jerks are trace and ankle jerks are 1 

bilaterally without clonus.  Toes are downgoing bilaterally to Babinski testing.














Results





- Laboratory Findings


CBC and BMP: 


 07/10/18 03:30





 07/10/18 03:30


Abnormal Lab Findings: 


 Abnormal Labs











  07/09/18 07/09/18 07/09/18





  14:28 14:28 17:09


 


WBC   


 


RBC  5.24 H  


 


Hgb  15.4 H  


 


Hct  48.3 H  


 


Lymph % (Auto)   


 


Mono #   


 


Seg Neutrophils %   


 


Seg Neutrophils #   


 


Carbon Dioxide   9 L* 


 


Glucose   110 H 


 


Lactic Acid    7.00 H*


 


AST   


 


ALT   














  07/09/18 07/09/18 07/09/18





  18:30 18:30 21:04


 


WBC   


 


RBC   


 


Hgb   


 


Hct   


 


Lymph % (Auto)   


 


Mono #   


 


Seg Neutrophils %   


 


Seg Neutrophils #   


 


Carbon Dioxide   18 L D 


 


Glucose   108 H 


 


Lactic Acid  5.40 H*   3.70 H*


 


AST   83 H 


 


ALT   69 H 














  07/10/18





  03:30


 


WBC  14.6 H


 


RBC 


 


Hgb 


 


Hct 


 


Lymph % (Auto)  9.1 L


 


Mono #  1.0 H


 


Seg Neutrophils %  83.7 H


 


Seg Neutrophils #  12.2 H


 


Carbon Dioxide 


 


Glucose 


 


Lactic Acid 


 


AST 


 


ALT 














Assessment and Plan


Impression:


1.  Complex partial epilepsy, intractable





Plan:


1.  Wrote out suggestion that [facility] 

[facility] for epilepsy surgery.  He sees him every 5-6 months but I suggested 

he try to see him sooner for medication change or for surgical workup.  I 

suggested he ask whether he might get the extended [facility] 

[facility].


2.  Could consider Vimpat or Trileptal.


3.  Will increase his levetiracetam to 2000 mg twice a day while here but he is 

told to take 2.5 tablets in the morning and 3 at night of his 750 mg size gives 

in the approximately same 4000 mg total.  Levetiracetam and lamotrigine levels 

are pending but should not delay his discharge.


4.  No need for EEG or MRI here since [facility], the [facility] being 

a year ago.


5.  I gave him a printout of medications to avoid including various antibiotics

, specific pain pills, and Benadryl all of which can trigger seizures.


6.  I told him and his fiance he needs a Mediset to keep track of his 

medications so he will be less liable to miss any.





50 minutes spent including review of multiple CT scan images and long 

discussion about epilepsy surgery, epilepsy surgery along with Neuropace 

implantation, and vagal nerve stimulator surgery.





Thank you for an interesting consultation in my area of subspecialty (epilepsy) 

on this pleasant late 40s male.

## 2018-07-10 NOTE — PROGRESS NOTE
Assessment and Plan


Assessment and plan: 





Acute on chronic seizure.  Cont AEDs.  Neurology consult.  Sz precautions





Hypertension.  Cont meds


Elevated lactate level.  Etiology likely secondary to seizure.  Leukocytosis 

but no fever or other signs of infection





Leukocytosis.  Etiology likely secondary to stress from sz





Anxiety.  Cont meds





TBI





Non-Compliance.  Pt counseled





Substance abuse.   Pt counseled





History


Interval history: 





No new issues





Hospitalist Physical





- Constitutional


Vitals: 


 











Temp Pulse Resp BP Pulse Ox


 


 98.1 F   72   20   137/91   98 


 


 07/10/18 06:30  07/10/18 06:30  07/10/18 06:30  07/10/18 06:30  07/10/18 09:20











General appearance: Present: no acute distress, well-nourished





- EENT


Eyes: Present: PERRL, EOM intact


ENT: hearing intact, clear oral mucosa, dentition normal





- Neck


Neck: Present: supple, normal ROM





- Respiratory


Respiratory effort: normal


Respiratory: bilateral: CTA





- Cardiovascular


Rhythm: regular


Heart Sounds: Present: S1 & S2.  Absent: gallop, rub





- Extremities


Extremities: no ischemia, No edema, Full ROM





- Abdominal


General gastrointestinal: soft, non-tender, non-distended, normal bowel sounds





- Integumentary


Integumentary: Present: clear, warm, dry





- Neurologic


Neurologic: CNII-XII intact, moves all extremities





Results





- Labs


CBC & Chem 7: 


 07/10/18 03:30





 07/10/18 03:30


Labs: 


 Laboratory Last Values











WBC  14.6 K/mm3 (4.5-11.0)  H  07/10/18  03:30    


 


RBC  4.85 M/mm3 (3.65-5.03)   07/10/18  03:30    


 


Hgb  14.3 gm/dl (11.8-15.2)   07/10/18  03:30    


 


Hct  42.7 % (35.5-45.6)   07/10/18  03:30    


 


MCV  88 fl (84-94)   07/10/18  03:30    


 


MCH  30 pg (28-32)   07/10/18  03:30    


 


MCHC  34 % (32-34)   07/10/18  03:30    


 


RDW  14.4 % (13.2-15.2)   07/10/18  03:30    


 


Plt Count  151 K/mm3 (140-440)   07/10/18  03:30    


 


Lymph % (Auto)  9.1 % (13.4-35.0)  L  07/10/18  03:30    


 


Mono % (Auto)  7.1 % (0.0-7.3)   07/10/18  03:30    


 


Eos % (Auto)  0.0 % (0.0-4.3)   07/10/18  03:30    


 


Baso % (Auto)  0.1 % (0.0-1.8)   07/10/18  03:30    


 


Lymph #  1.3 K/mm3 (1.2-5.4)   07/10/18  03:30    


 


Mono #  1.0 K/mm3 (0.0-0.8)  H  07/10/18  03:30    


 


Eos #  0.0 K/mm3 (0.0-0.4)   07/10/18  03:30    


 


Baso #  0.0 K/mm3 (0.0-0.1)   07/10/18  03:30    


 


Seg Neutrophils %  83.7 % (40.0-70.0)  H  07/10/18  03:30    


 


Seg Neutrophils #  12.2 K/mm3 (1.8-7.7)  H  07/10/18  03:30    


 


Sodium  143 mmol/L (137-145)   07/10/18  03:30    


 


Potassium  4.5 mmol/L (3.6-5.0)   07/10/18  03:30    


 


Chloride  102.7 mmol/L ()   07/10/18  03:30    


 


Carbon Dioxide  22 mmol/L (22-30)   07/10/18  03:30    


 


Anion Gap  23 mmol/L  07/10/18  03:30    


 


BUN  13 mg/dL (9-20)   07/10/18  03:30    


 


Creatinine  1.0 mg/dL (0.8-1.5)   07/10/18  03:30    


 


Estimated GFR  > 60 ml/min  07/10/18  03:30    


 


BUN/Creatinine Ratio  13 %  07/10/18  03:30    


 


Glucose  81 mg/dL ()   07/10/18  03:30    


 


POC Glucose  94  ()   07/09/18  14:55    


 


Lactic Acid  1.30 mmol/L (0.7-2.0)   07/09/18  22:44    


 


Calcium  9.4 mg/dL (8.4-10.2)   07/10/18  03:30    


 


Total Bilirubin  0.50 mg/dL (0.1-1.2)   07/09/18  18:30    


 


AST  83 units/L (5-40)  H  07/09/18  18:30    


 


ALT  69 units/L (7-56)  H  07/09/18  18:30    


 


Alkaline Phosphatase  70 units/L ()   07/09/18  18:30    


 


Total Protein  7.1 g/dL (6.3-8.2)   07/09/18  18:30    


 


Albumin  4.6 g/dL (3.9-5)   07/09/18  18:30    


 


Albumin/Globulin Ratio  1.8 %  07/09/18  18:30    


 


Urine Color  Yellow  (Yellow)   07/09/18  17:52    


 


Urine Turbidity  Clear  (Clear)   07/09/18  17:52    


 


Urine pH  5.0  (5.0-7.0)   07/09/18  17:52    


 


Ur Specific Gravity  1.014  (1.003-1.030)   07/09/18  17:52    


 


Urine Protein  30 mg/dl mg/dL (Negative)   07/09/18  17:52    


 


Urine Glucose (UA)  Neg mg/dL (Negative)   07/09/18  17:52    


 


Urine Ketones  20 mg/dL (Negative)   07/09/18  17:52    


 


Urine Blood  Sm  (Negative)   07/09/18  17:52    


 


Urine Nitrite  Neg  (Negative)   07/09/18  17:52    


 


Urine Bilirubin  Neg  (Negative)   07/09/18  17:52    


 


Urine Urobilinogen  < 2.0 mg/dL (<2.0)   07/09/18  17:52    


 


Ur Leukocyte Esterase  Neg  (Negative)   07/09/18  17:52    


 


Urine WBC (Auto)  1.0 /HPF (0.0-6.0)   07/09/18  17:52    


 


Urine RBC (Auto)  3.0 /HPF (0.0-6.0)   07/09/18  17:52    


 


Urine Mucus  Few /HPF  07/09/18  17:52    


 


Urine Opiates Screen  Presumptive negative   07/09/18  17:52    


 


Urine Methadone Screen  Presumptive negative   07/09/18  17:52    


 


Ur Barbiturates Screen  Presumptive negative   07/09/18  17:52    


 


Ur Phencyclidine Scrn  Presumptive negative   07/09/18  17:52    


 


Ur Amphetamines Screen  Presumptive negative   07/09/18  17:52    


 


U Benzodiazepines Scrn  Presumptive negative   07/09/18  17:52    


 


Urine Cocaine Screen  Presumptive positive   07/09/18  17:52    


 


U Marijuana (THC) Screen  Presumptive positive   07/09/18  17:52    


 


Drugs of Abuse Note  Disclamer   07/09/18  17:52

## 2018-07-11 VITALS — SYSTOLIC BLOOD PRESSURE: 160 MMHG | DIASTOLIC BLOOD PRESSURE: 100 MMHG

## 2018-07-11 LAB
BASOPHILS # (AUTO): 0 K/MM3 (ref 0–0.1)
BASOPHILS NFR BLD AUTO: 0.4 % (ref 0–1.8)
BUN SERPL-MCNC: 8 MG/DL (ref 9–20)
BUN/CREAT SERPL: 8 %
CALCIUM SERPL-MCNC: 9.1 MG/DL (ref 8.4–10.2)
EOSINOPHIL # BLD AUTO: 0.1 K/MM3 (ref 0–0.4)
EOSINOPHIL NFR BLD AUTO: 1.4 % (ref 0–4.3)
HCT VFR BLD CALC: 40.9 % (ref 35.5–45.6)
HEMOLYSIS INDEX: 3
HGB BLD-MCNC: 13.9 GM/DL (ref 11.8–15.2)
LYMPHOCYTES # BLD AUTO: 2.5 K/MM3 (ref 1.2–5.4)
LYMPHOCYTES NFR BLD AUTO: 33.7 % (ref 13.4–35)
MCH RBC QN AUTO: 30 PG (ref 28–32)
MCHC RBC AUTO-ENTMCNC: 34 % (ref 32–34)
MCV RBC AUTO: 88 FL (ref 84–94)
MONOCYTES # (AUTO): 0.7 K/MM3 (ref 0–0.8)
MONOCYTES % (AUTO): 9.7 % (ref 0–7.3)
PLATELET # BLD: 142 K/MM3 (ref 140–440)
RBC # BLD AUTO: 4.66 M/MM3 (ref 3.65–5.03)

## 2018-07-11 RX ADMIN — ACETAMINOPHEN PRN MG: 325 TABLET ORAL at 07:07

## 2018-07-11 RX ADMIN — ZIPRASIDONE HYDROCHLORIDE SCH MG: 20 CAPSULE ORAL at 11:28

## 2018-07-11 RX ADMIN — ENOXAPARIN SODIUM SCH: 100 INJECTION SUBCUTANEOUS at 11:29

## 2018-07-11 RX ADMIN — LEVETIRACETAM SCH MG: 500 TABLET, FILM COATED ORAL at 11:28

## 2018-07-11 RX ADMIN — CEFTRIAXONE SODIUM SCH: 1 INJECTION, POWDER, FOR SOLUTION INTRAMUSCULAR; INTRAVENOUS at 11:29

## 2018-07-11 RX ADMIN — Medication SCH ML: at 11:29

## 2018-07-11 RX ADMIN — SODIUM CHLORIDE SCH MLS/HR: 0.45 INJECTION, SOLUTION INTRAVENOUS at 07:02

## 2018-07-11 NOTE — DISCHARGE SUMMARY
Providers





- Providers


Date of Admission: 


07/09/18 19:39





Date of discharge: 07/11/18


Attending physician: 


MARTÍN COBIAN





 





07/10/18 10:22


Consult to Physician [CONS] Routine 


   Comment: 


   Consulting Provider: DARIEL GORDON


   Physician Instructions: 


   Reason For Exam: nataliia











Primary care physician: 


PRIMARY CARE MD








Hospitalization


Reason for admission: nataliia


Condition: Stable


Hospital course: 





This is a 47-year-old male who presented through the emergency department with 

diagnosis of complex partial epilepsy, intractable.  He has had seizures since 

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  Another head injury caused seizures 

to recur in 2005 when he was no longer on medication.  Per neurology, According 

to his fiance, he has 1 or 2 seizures per month only some of which are known 

to be related to use of cocaine which he says he uses rarely though he had one 

associated with cocaine both in May and at the time of this admission.  He does 

however miss medications at times and does not use a Mediset. CT scan here 

shows moderate cerebellar and moderate to severe cerebral atrophy.  Patient was 

seen by neurology in consultation who recommended a plan and discussions as 

illustrated below by Dr. Gordon 


Plan:


1.  Wrote out suggestion that [facility] be 

[facility] for epilepsy [facility].  He sees him every 5-6 months but I suggested 

he try to see him sooner for medication change or for surgical workup.  I 

suggested he ask whether he might get the extended [facility].


2.  Could consider Vimpat or Trileptal.


3.  Will increase his levetiracetam to 2000 mg twice a day while here but he is 

told to take 2.5 tablets in the morning and 3 at night of his 750 mg size gives 

in the approximately same 4000 mg total.  Levetiracetam and lamotrigine levels 

are pending but should not delay his discharge.


4.  No need for EEG or MRI here since [facility], the [facility] being 

a year ago.


5.  I gave him a printout of medications to avoid including various antibiotics

, specific pain pills, and Benadryl all of which can trigger seizures.


6.  I told him and his fiance he needs a Mediset to keep track of his 

medications so he will be less liable to miss any.


Patient is to follow-up as above.  Dedicated discharge time 33 minutes 


Disposition: DC-01 TO HOME OR SELFCARE


Time spent for discharge: 33





- Discharge Diagnoses


(1) Cocaine abuse


Status: Acute   





(2) Hypertension


Status: Acute   


Qualifiers: 


   Hypertension type: essential hypertension   Qualified Code(s): I10 - 

Essential (primary) hypertension   





(3) Seizure


Status: Acute   





Core Measure Documentation





- Palliative Care


Palliative Care/ Comfort Measures: Not Applicable





- Core Measures


Any of the following diagnoses?: none





Exam





- Constitutional


Vitals: 


 











Temp Pulse Resp BP Pulse Ox


 


 98.1 F   70   20   161/98   99 


 


 07/11/18 05:34  07/11/18 05:34  07/11/18 07:07  07/11/18 05:34  07/11/18 05:34











General appearance: Present: no acute distress, well-nourished





- EENT


Eyes: Present: PERRL


ENT: hearing intact, clear oral mucosa





- Neck


Neck: Present: supple, normal ROM





- Respiratory


Respiratory effort: normal


Respiratory: bilateral: CTA





- Cardiovascular


Heart Sounds: Present: S1 & S2.  Absent: rub, click





- Extremities


Extremities: pulses symmetrical, No edema


Peripheral Pulses: within normal limits





- Abdominal


General gastrointestinal: Present: soft, non-tender, non-distended, normal 

bowel sounds


Male genitourinary: Present: normal





- Integumentary


Integumentary: Present: clear, warm, dry





- Musculoskeletal


Musculoskeletal: gait normal, strength equal bilaterally





- Psychiatric


Psychiatric: appropriate mood/affect, intact judgment & insight





- Neurologic


Neurologic: CNII-XII intact, moves all extremities





Plan


Activity: no restrictions


Weight Bearing Status: Full Weight Bearing


Diet: regular


Follow up with: 


PRIMARY CARE,MD [Primary Care Provider] - 3-5 Days


Prescriptions: 


Hydroxyzine HCl 10 mg PO TID PRN #90 tablet


 PRN Reason: Itching


lamoTRIgine [LaMICtal] 200 mg PO BID #60 tablet


levETIRAcetam [Keppra TAB] 1,500 mg PO BID #60 tablet


levETIRAcetam [Keppra TAB] 2,000 mg PO BID #60 tablet


Magnesium Oxide 420 mg PO BID #60 tablet


traZODone [Desyrel] 100 mg PO QHS #30 tablet


Ziprasidone [Geodon] 40 mg PO BID #60 capsule

## 2020-10-10 ENCOUNTER — HOSPITAL ENCOUNTER (EMERGENCY)
Dept: HOSPITAL 5 - ED | Age: 50
Discharge: HOME | End: 2020-10-10
Payer: OTHER GOVERNMENT

## 2020-10-10 VITALS — DIASTOLIC BLOOD PRESSURE: 89 MMHG | SYSTOLIC BLOOD PRESSURE: 126 MMHG

## 2020-10-10 DIAGNOSIS — R10.9: ICD-10-CM

## 2020-10-10 DIAGNOSIS — F43.10: ICD-10-CM

## 2020-10-10 DIAGNOSIS — I10: ICD-10-CM

## 2020-10-10 DIAGNOSIS — R56.9: ICD-10-CM

## 2020-10-10 DIAGNOSIS — Z98.890: ICD-10-CM

## 2020-10-10 DIAGNOSIS — E86.0: ICD-10-CM

## 2020-10-10 DIAGNOSIS — Z79.899: ICD-10-CM

## 2020-10-10 DIAGNOSIS — E87.6: Primary | ICD-10-CM

## 2020-10-10 LAB
ALBUMIN SERPL-MCNC: 4.1 G/DL (ref 3.9–5)
ALT SERPL-CCNC: 26 UNITS/L (ref 7–56)
ANISOCYTOSIS BLD QL SMEAR: (no result)
BAND NEUTROPHILS # (MANUAL): 0 K/MM3
BILIRUB DIRECT SERPL-MCNC: 0.2 MG/DL (ref 0–0.2)
BILIRUB UR QL STRIP: (no result)
BLOOD UR QL VISUAL: (no result)
BUN SERPL-MCNC: 20 MG/DL (ref 9–20)
BUN/CREAT SERPL: 20 %
CALCIUM SERPL-MCNC: 9.4 MG/DL (ref 8.4–10.2)
HCT VFR BLD CALC: 38.4 % (ref 35.5–45.6)
HEMOLYSIS INDEX: 1
HGB BLD-MCNC: 13.3 GM/DL (ref 11.8–15.2)
INR PPP: 0.96 (ref 0.87–1.13)
MCHC RBC AUTO-ENTMCNC: 35 % (ref 32–34)
MCV RBC AUTO: 87 FL (ref 84–94)
MUCOUS THREADS #/AREA URNS HPF: (no result) /HPF
MYELOCYTES # (MANUAL): 0 K/MM3
PH UR STRIP: 6 [PH] (ref 5–7)
PLATELET # BLD: 153 K/MM3 (ref 140–440)
PROMYELOCYTES # (MANUAL): 0 K/MM3
RBC # BLD AUTO: 4.41 M/MM3 (ref 3.65–5.03)
RBC #/AREA URNS HPF: 3 /HPF (ref 0–6)
TOTAL CELLS COUNTED BLD: 100
UROBILINOGEN UR-MCNC: < 2 MG/DL (ref ?–2)
WBC #/AREA URNS HPF: < 1 /HPF (ref 0–6)

## 2020-10-10 PROCEDURE — 96361 HYDRATE IV INFUSION ADD-ON: CPT

## 2020-10-10 PROCEDURE — 93005 ELECTROCARDIOGRAM TRACING: CPT

## 2020-10-10 PROCEDURE — 85007 BL SMEAR W/DIFF WBC COUNT: CPT

## 2020-10-10 PROCEDURE — 80048 BASIC METABOLIC PNL TOTAL CA: CPT

## 2020-10-10 PROCEDURE — 99285 EMERGENCY DEPT VISIT HI MDM: CPT

## 2020-10-10 PROCEDURE — 96375 TX/PRO/DX INJ NEW DRUG ADDON: CPT

## 2020-10-10 PROCEDURE — 82550 ASSAY OF CK (CPK): CPT

## 2020-10-10 PROCEDURE — 96365 THER/PROPH/DIAG IV INF INIT: CPT

## 2020-10-10 PROCEDURE — 83735 ASSAY OF MAGNESIUM: CPT

## 2020-10-10 PROCEDURE — 85610 PROTHROMBIN TIME: CPT

## 2020-10-10 PROCEDURE — 80076 HEPATIC FUNCTION PANEL: CPT

## 2020-10-10 PROCEDURE — 83690 ASSAY OF LIPASE: CPT

## 2020-10-10 PROCEDURE — 74177 CT ABD & PELVIS W/CONTRAST: CPT

## 2020-10-10 PROCEDURE — 36415 COLL VENOUS BLD VENIPUNCTURE: CPT

## 2020-10-10 PROCEDURE — 85025 COMPLETE CBC W/AUTO DIFF WBC: CPT

## 2020-10-10 PROCEDURE — 81001 URINALYSIS AUTO W/SCOPE: CPT

## 2020-10-10 NOTE — EMERGENCY DEPARTMENT REPORT
ED General Adult HPI





- General


Chief complaint: Abdominal Pain


Stated complaint: Abdominal pain, nausea, vomiting and diarrhea


PUI?: No


Time Seen by Provider: 10/10/20 07:51


Source: patient, EMS, RN notes reviewed, old records reviewed


Mode of arrival: Stretcher


Limitations: No Limitations





- History of Present Illness


Initial comments: 





The patient was evaluated in the emergency department for symptoms described in 

the history of present illness.  He/she was evaluated in the context of the 

global COVID-19 pandemic, which necessitated consideration that the patient 

might be at risk for infection with the virus that causes COVID-19.  

Institutional protocols and algorithms that pertain to the evaluation of 

patients at risk for COVID-19 are in a state of rapid change based on 

information released by regulatory bodies including the CDC and federal and 

state organizations.  These policies and algorithms were followed during the 

patient's care in the emergency department.  Please note that these policies, 

procedures and recommendations changed on a rapid basis.








Verbal report received from emergency medical services.  EMS documentation not 

available at time of chart dictation





Patient is a 49-year-old gentleman with a history of cocaine use, seizure 

disorder, reports no history of abdominal surgeries to myself, who is brought to

the hospital by emergency medical services with a complaint of abdominal pain, 

nausea and vomiting.





Patient states his pain started this morning.  He indicates it is "all over" his

abdomen.  He reports multiple episodes of nonbloody, nonbilious emesis.  He 

reports a few episodes of watery diarrhea.  He denies hematemesis and bright red

blood per rectum.  He denies dysuria.  He denies testicular pain.  He 

occasionally consumes marijuana.  He makes no complaint of headache, neck pain, 

chest pain, shortness of breath, urinary symptoms, he does endorse muscle aches 

and body aches.





He was given Zofran by EMS in the field.





He was given hydromorphone by myself, which greatly improved his symptoms


-: Gradual, hour(s)


Location: abdomen


Radiation: non-radiation


Quality: aching


Consistency: constant


Improves with: medication


Worsens with: eating





- Related Data


                                  Previous Rx's











 Medication  Instructions  Recorded  Last Taken  Type


 


Magnesium Oxide 420 mg PO BID #60 tablet 07/11/18 Unknown Rx


 


Ziprasidone [Geodon] 40 mg PO BID #60 capsule 07/11/18 Unknown Rx


 


hydrOXYzine HCL [Hydroxyzine HCl] 10 mg PO TID PRN #90 tablet 07/11/18 Unknown 

Rx


 


labetaloL [Labetalol 200mg TAB] 200 mg PO BID #60 tablet 07/11/18 Unknown Rx


 


lamoTRIgine [LaMICtal] 200 mg PO BID #60 tablet 07/11/18 Unknown Rx


 


levETIRAcetam [Keppra TAB] 1,500 mg PO BID #60 tablet 07/11/18 Unknown Rx


 


levETIRAcetam [Keppra TAB] 2,000 mg PO BID #60 tablet 07/11/18 Unknown Rx


 


traZODone [Desyrel] 100 mg PO QHS #30 tablet 07/11/18 Unknown Rx


 


levETIRAcetam [Keppra TAB] 1,500 mg PO BID #60 tablet 07/03/20 Unknown Rx


 


Acetaminophen [Non-Aspirin Extra 500 mg PO Q6HR PRN #30 tablet 10/10/20 Unknown 

Rx





Strength]    


 


Ginger Root [Ginger] 250 mg PO QID PRN #30 capsule 10/10/20 Unknown Rx


 


Ondansetron [Zofran Odt] 4 mg PO Q8HR PRN #20 tab.rapdis 10/10/20 Unknown Rx


 


Potassium Chloride [K-Dur] 20 meq PO BID #30 tablet 10/10/20 Unknown Rx











                                    Allergies











Allergy/AdvReac Type Severity Reaction Status Date / Time


 


No Known Allergies Allergy   Unverified 05/27/18 12:15














ED Review of Systems


ROS: 


Stated complaint: ABD PAIN


Other details as noted in HPI





Constitutional: malaise.  denies: fever


Eyes: denies: eye discharge


ENT: denies: epistaxis


Respiratory: denies: cough


Cardiovascular: denies: chest pain


Gastrointestinal: abdominal pain, nausea, vomiting, diarrhea


Genitourinary: denies: urgency, dysuria, testicular pain


Musculoskeletal: myalgia


Skin: denies: lesions


Neurological: weakness


Psychiatric: anxiety


Hematological/Lymphatic: denies: easy bleeding





ED Past Medical Hx





- Past Medical History


Hx Hypertension: Yes


Hx Congestive Heart Failure: No


Hx Diabetes: No


Hx Seizures: Yes


Hx Psychiatric Treatment: Yes (PTSD)


Hx Asthma: No


Hx COPD: No


Additional medical history: PTSD, Traumatic brain injury





- Surgical History


Additional Surgical History: Right shoulder surgery





- Social History


Smoking Status: Never Smoker


Substance Use Type: None





- Medications


Home Medications: 


                                Home Medications











 Medication  Instructions  Recorded  Confirmed  Last Taken  Type


 


Magnesium Oxide 420 mg PO BID #60 tablet 07/11/18  Unknown Rx


 


Ziprasidone [Geodon] 40 mg PO BID #60 capsule 07/11/18  Unknown Rx


 


hydrOXYzine HCL [Hydroxyzine HCl] 10 mg PO TID PRN #90 tablet 07/11/18  Unknown 

Rx


 


labetaloL [Labetalol 200mg TAB] 200 mg PO BID #60 tablet 07/11/18  Unknown Rx


 


lamoTRIgine [LaMICtal] 200 mg PO BID #60 tablet 07/11/18  Unknown Rx


 


levETIRAcetam [Keppra TAB] 1,500 mg PO BID #60 tablet 07/11/18  Unknown Rx


 


levETIRAcetam [Keppra TAB] 2,000 mg PO BID #60 tablet 07/11/18  Unknown Rx


 


traZODone [Desyrel] 100 mg PO QHS #30 tablet 07/11/18  Unknown Rx


 


levETIRAcetam [Keppra TAB] 1,500 mg PO BID #60 tablet 07/03/20  Unknown Rx


 


Acetaminophen [Non-Aspirin Extra 500 mg PO Q6HR PRN #30 tablet 10/10/20  Unknown

 Rx





Strength]     


 


Ginger Root [Ginger] 250 mg PO QID PRN #30 capsule 10/10/20  Unknown Rx


 


Ondansetron [Zofran Odt] 4 mg PO Q8HR PRN #20 tab.rapdis 10/10/20  Unknown Rx


 


Potassium Chloride [K-Dur] 20 meq PO BID #30 tablet 10/10/20  Unknown Rx














ED Physical Exam





- General


Limitations: No Limitations


General appearance: alert, anxious, in distress





- Head


Head exam: Present: atraumatic, normocephalic





- Eye


Eye exam: Present: normal appearance, EOMI.  Absent: nystagmus





- ENT


ENT exam: Present: normal exam, normal orophraynx, mucous membranes moist, 

normal external ear exam





- Neck


Neck exam: Present: normal inspection, full ROM.  Absent: tenderness, meningism

us





- Respiratory


Respiratory exam: Present: normal lung sounds bilaterally.  Absent: respiratory 

distress, wheezes, rales, rhonchi, stridor





- Cardiovascular


Cardiovascular Exam: Present: regular rate, normal rhythm, normal heart sounds. 

Absent: bradycardia, tachycardia, irregular rhythm, systolic murmur, diastolic 

murmur, rubs, gallop





- GI/Abdominal


GI/Abdominal exam: Present: soft.  Absent: distended, tenderness, guarding, 

rebound, rigid, pulsatile mass





- Rectal


Rectal exam: Present: deferred





- 


 exam: Present: normal inspection, other (There is normal testicular lie.  

There is normal cremasteric reflex.  There is no testicular tenderness.  There 

is no testicular swelling).  Absent: testicular tenderness


External exam: Present: other (Chaperoned by nurse Willian Rivera)





- Extremities Exam


Extremities exam: Present: normal inspection, full ROM, other (2+ pulses noted 

in the bilateral upper and lower extremities.  There is no palpable cord.   

negative Homans sign.  Muscular compartments are soft.  The pelvis is stable.). 

Absent: pedal edema, calf tenderness





- Back Exam


Back exam: Present: normal inspection, full ROM.  Absent: tenderness, CVA 

tenderness (R), CVA tenderness (L), paraspinal tenderness, vertebral tenderness





- Neurological Exam


Neurological exam: Present: alert, other (No facial droop.  Tongue midline.  

Extraocular movements intact bilaterally.  Facial sensation intact to light 

touch in V1, V2, V3 distribution bilaterally.  5 and a 5 strength in 4 

extremities.  Sensation intact to light touch in 4 extremities.)





- Psychiatric


Psychiatric exam: Present: anxious





- Skin


Skin exam: Present: warm, dry, intact, normal color.  Absent: rash





ED Course


                                   Vital Signs











  10/10/20 10/10/20 10/10/20





  08:11 08:19 09:36


 


Temperature 98.2 F  


 


Pulse Rate 100 H  


 


Respiratory 22 12 18





Rate   


 


Blood Pressure 128/82  


 


Blood Pressure 128/82  





[Right]   


 


O2 Sat by Pulse 100  100





Oximetry   














- Reevaluation(s)


Reevaluation #1: 





10/10/20 09:19


Differential diagnosis, including but not limited to: Cannabinoid hyperemesis 

syndrome, enteritis, colitis, diverticulitis, perforated viscus, obstruction





Assessment and plan: 49-year-old gentleman brought to the hospital by EMS, upon 

initial arrival is in moderate to severe distress, writhing around his 

stretcher, medicated with hydromorphone, now appearing much more comfortable.





Check basic labs, EKG, urinalysis, CT scan of the abdomen pelvis, and reassess.





Of note, laboratory studies did demonstrate very mild hypokalemia and 

dehydration.





We will replete hypokalemia and give IV fluids.


10/10/20 09:21


Patient has not had a seizure, but he does have a history of seizure disorder, 

he has not taken his medication this morning, we will therefore give Keppra IV.


Reevaluation #2: 





10/10/20 10:01


Tachycardia resolved.  No active vomiting.  Patient speaking on cell phone, does

 not appear to be in any acute distress





ED Medical Decision Making





- Lab Data


Result diagrams: 


                                 10/10/20 08:10





                                 10/10/20 08:10








                                   Vital Signs











  10/10/20 10/10/20





  08:11 08:19


 


Temperature 98.2 F 


 


Pulse Rate 100 H 


 


Respiratory 22 12





Rate  


 


Blood Pressure 128/82 


 


Blood Pressure 128/82 





[Right]  


 


O2 Sat by Pulse 100 





Oximetry  











                                   Lab Results











  10/10/20 10/10/20 10/10/20 Range/Units





  08:10 08:10 08:10 


 


WBC  7.8    (4.5-11.0)  K/mm3


 


RBC  4.41    (3.65-5.03)  M/mm3


 


Hgb  13.3    (11.8-15.2)  gm/dl


 


Hct  38.4    (35.5-45.6)  %


 


MCV  87    (84-94)  fl


 


MCH  30    (28-32)  pg


 


MCHC  35 H    (32-34)  %


 


RDW  14.0    (13.2-15.2)  %


 


Plt Count  153    (140-440)  K/mm3


 


Seg Neutrophils %  Np    


 


PT   12.9   (12.2-14.9)  Sec.


 


INR   0.96   (0.87-1.13)  


 


Sodium    142  (137-145)  mmol/L


 


Potassium    3.2 L  (3.6-5.0)  mmol/L


 


Chloride    105.8  ()  mmol/L


 


Carbon Dioxide    19 L  (22-30)  mmol/L


 


Anion Gap    20  mmol/L


 


BUN    20  (9-20)  mg/dL


 


Creatinine    1.0  (0.8-1.3)  mg/dL


 


Estimated GFR    > 60  ml/min


 


BUN/Creatinine Ratio    20  %


 


Glucose    163 H  ()  mg/dL


 


Calcium    9.4  (8.4-10.2)  mg/dL


 


Magnesium     (1.7-2.3)  mg/dL


 


Total Bilirubin    0.90  (0.1-1.2)  mg/dL


 


Direct Bilirubin    0.2  (0-0.2)  mg/dL


 


Indirect Bilirubin    0.7  mg/dL


 


AST    14  (5-40)  units/L


 


ALT    26  (7-56)  units/L


 


Alkaline Phosphatase    60  ()  units/L


 


Total Creatine Kinase     ()  units/L


 


Total Protein    6.4  (6.3-8.2)  g/dL


 


Albumin    4.1  (3.9-5)  g/dL


 


Albumin/Globulin Ratio    1.8  %


 


Lipase    20  (13-60)  units/L














  10/10/20 Range/Units





  08:10 


 


WBC   (4.5-11.0)  K/mm3


 


RBC   (3.65-5.03)  M/mm3


 


Hgb   (11.8-15.2)  gm/dl


 


Hct   (35.5-45.6)  %


 


MCV   (84-94)  fl


 


MCH   (28-32)  pg


 


MCHC   (32-34)  %


 


RDW   (13.2-15.2)  %


 


Plt Count   (140-440)  K/mm3


 


Seg Neutrophils %   


 


PT   (12.2-14.9)  Sec.


 


INR   (0.87-1.13)  


 


Sodium   (137-145)  mmol/L


 


Potassium   (3.6-5.0)  mmol/L


 


Chloride   ()  mmol/L


 


Carbon Dioxide   (22-30)  mmol/L


 


Anion Gap   mmol/L


 


BUN   (9-20)  mg/dL


 


Creatinine   (0.8-1.3)  mg/dL


 


Estimated GFR   ml/min


 


BUN/Creatinine Ratio   %


 


Glucose   ()  mg/dL


 


Calcium   (8.4-10.2)  mg/dL


 


Magnesium  1.80  (1.7-2.3)  mg/dL


 


Total Bilirubin   (0.1-1.2)  mg/dL


 


Direct Bilirubin   (0-0.2)  mg/dL


 


Indirect Bilirubin   mg/dL


 


AST   (5-40)  units/L


 


ALT   (7-56)  units/L


 


Alkaline Phosphatase   ()  units/L


 


Total Creatine Kinase  114  ()  units/L


 


Total Protein   (6.3-8.2)  g/dL


 


Albumin   (3.9-5)  g/dL


 


Albumin/Globulin Ratio   %


 


Lipase   (13-60)  units/L














- EKG Data


-: EKG Interpreted by Me


EKG shows normal: sinus rhythm


Rate: normal





- EKG Data


Interpretation: unchanged when compared t





10/10/20 09:20


The EKG today shows a sinus rhythm, 81 bpm, normal axis, left ventricular 

hypertrophy, QTC prolonged, 459 ms, this EKG is abnormal.  The EKG is not a 

STEMI.  The EKG appears to be fairly unchanged when compared to prior EKG from 

July 2018





- Radiology Data


Radiology results: report reviewed, image reviewed





CT scan of the abdomen pelvis shows no acute findings.  Chronic findings noted


Critical care attestation.: 


If time is entered above; I have spent that time in minutes in the direct care 

of this critically ill patient, excluding procedure time.








ED Disposition


Clinical Impression: 


 Hypokalemia, Dehydration, Acute abdominal pain





Disposition: DC-01 TO HOME OR SELFCARE


Is pt being admited?: No


Does the pt Need Aspirin: No


Condition: Stable


Additional Instructions: 


Take the pain medication, nausea medication as needed and directed, and take the

 potassium supplementation as directed.  Patient may take over-the-counter a

cetaminophen as needed for pain, strongly recommend that the patient minimize 

consumption of Motrin, ibuprofen, Naprosyn, Aleve, alcohol, tobacco, marijuana 

and smoke products.





Patient most likely having a viral enteritis, this is typically a self resolving

 condition.  Advance diet as tolerated, drink plenty of fluids, patient may 

initiate gentle diet with bread, rice, apples and toast.





CT scan of the abdomen pelvis showed a number of nonemergent incidental 

findings, such as presumed hepatic cysts, and diverticula in the patient's large

 intestine.  We recommend that the patient follow-up with a primary care doctor 

or gastroenterologist for these nonemergent incidental findings within the next 

month.





Recommend that the patient have a primary care doctor or gastroenterologist 

contact medical records department to obtain CT scan report, to follow-up on 

nonemergent incidental findings.





Recommend repeat evaluation within the next 3 to 5 days by either a primary care

 doctor, or family physician.





Do not take metformin medication for the next 2 days, if patient takes this 

medication.





Please return to the emergency room right away with new pain, worsened pain, 

migration of pain, projectile vomiting, change in mental status, confusion, 

inability to tolerate liquid feeds, new, worsened or different symptoms not 

present on the initial emergency room evaluation 


Referrals: 


[facility] [Primary Care Provider] - 3-5 Days


[facility] [Provider Group] - 3-5 Days


Forms:  Work/School Release Form(ED)

## 2020-10-10 NOTE — CAT SCAN REPORT
CT ABDOMEN AND PELVIS WITH IV CONTRAST



INDICATION:

MAIN. Acute abdominal pain nausea and vomiting past several days



COMPARISON:

None available.



TECHNIQUE:

Axial CT images were obtained through the abdomen and pelvis after IV contrast. All CT scans at this 
location are performed using CT dose reduction for ALARA by means of automated exposure control. 



FINDINGS -- ABDOMEN:

Lung Bases: No acute abnormality.

Liver: Multiple low-density lesions scattered throughout the liver are most consistent with cysts..

Gallbladder: Normal.  Bile Ducts: Normal.

Pancreas: Normal.

Spleen: Normal.

Adrenals: Normal.

Right Kidney and Proximal Ureter: Normal. Multiple tiny cysts.

Left Kidney and Proximal Ureter: Normal. Multiple tiny cystlike lesions.

Stomach and Bowel: Normal.

Lymph Nodes: No significant adenopathy.

Aorta: No significant abnormality.  IVC: Normal.

Additional Findings: None.



FINDINGS -- PELVIS:

Urinary Bladder and Distal Ureters: Normal.

Reproductive Organs: No acute abnormality.

Appendix: Normal.  Bowel: No acute abnormality. Sigmoid diverticulosis without diverticulitis.

Free Fluid: None.

Lymph Nodes: No significant adenopathy.

Additional Findings: None.



Skeletal System: No acute abnormality.



IMPRESSION:

1. No acute process in the abdomen or pelvis. Colonic diverticulosis without diverticulitis. Multiple
 hepatic cysts like lesions, as above.



Signer Name: Olivier Balderrama MD 

Signed: 10/10/2020 9:32 AM

Workstation Name: [id number]